# Patient Record
Sex: FEMALE | Race: BLACK OR AFRICAN AMERICAN | NOT HISPANIC OR LATINO | URBAN - METROPOLITAN AREA
[De-identification: names, ages, dates, MRNs, and addresses within clinical notes are randomized per-mention and may not be internally consistent; named-entity substitution may affect disease eponyms.]

---

## 2024-10-11 ENCOUNTER — EMERGENCY (EMERGENCY)
Facility: HOSPITAL | Age: 59
LOS: 1 days | Discharge: HOME | End: 2024-10-11
Attending: EMERGENCY MEDICINE
Payer: COMMERCIAL

## 2024-10-11 VITALS
RESPIRATION RATE: 18 BRPM | HEART RATE: 79 BPM | SYSTOLIC BLOOD PRESSURE: 155 MMHG | HEIGHT: 62 IN | TEMPERATURE: 98 F | OXYGEN SATURATION: 99 % | DIASTOLIC BLOOD PRESSURE: 94 MMHG

## 2024-10-11 VITALS
SYSTOLIC BLOOD PRESSURE: 145 MMHG | RESPIRATION RATE: 16 BRPM | HEART RATE: 70 BPM | DIASTOLIC BLOOD PRESSURE: 89 MMHG | OXYGEN SATURATION: 99 %

## 2024-10-11 LAB
ALBUMIN SERPL ELPH-MCNC: 3.9 G/DL — SIGNIFICANT CHANGE UP (ref 3.3–5)
ALP SERPL-CCNC: 112 U/L — SIGNIFICANT CHANGE UP (ref 40–120)
ALT FLD-CCNC: 21 U/L — SIGNIFICANT CHANGE UP (ref 10–45)
ANION GAP SERPL CALC-SCNC: 10 MMOL/L — SIGNIFICANT CHANGE UP (ref 5–17)
APPEARANCE UR: CLEAR — SIGNIFICANT CHANGE UP
AST SERPL-CCNC: 20 U/L — SIGNIFICANT CHANGE UP (ref 10–40)
BACTERIA # UR AUTO: NEGATIVE /HPF — SIGNIFICANT CHANGE UP
BASOPHILS # BLD AUTO: 0.02 K/UL — SIGNIFICANT CHANGE UP (ref 0–0.2)
BASOPHILS NFR BLD AUTO: 0.3 % — SIGNIFICANT CHANGE UP (ref 0–2)
BILIRUB SERPL-MCNC: 0.5 MG/DL — SIGNIFICANT CHANGE UP (ref 0.2–1.2)
BILIRUB UR-MCNC: NEGATIVE — SIGNIFICANT CHANGE UP
BUN SERPL-MCNC: 8 MG/DL — SIGNIFICANT CHANGE UP (ref 7–23)
CALCIUM SERPL-MCNC: 9.7 MG/DL — SIGNIFICANT CHANGE UP (ref 8.4–10.5)
CAST: 0 /LPF — SIGNIFICANT CHANGE UP (ref 0–4)
CHLORIDE SERPL-SCNC: 102 MMOL/L — SIGNIFICANT CHANGE UP (ref 96–108)
CO2 SERPL-SCNC: 25 MMOL/L — SIGNIFICANT CHANGE UP (ref 22–31)
COLOR SPEC: YELLOW — SIGNIFICANT CHANGE UP
CREAT SERPL-MCNC: 0.62 MG/DL — SIGNIFICANT CHANGE UP (ref 0.5–1.3)
DIFF PNL FLD: NEGATIVE — SIGNIFICANT CHANGE UP
EGFR: 104 ML/MIN/1.73M2 — SIGNIFICANT CHANGE UP
EOSINOPHIL # BLD AUTO: 0.12 K/UL — SIGNIFICANT CHANGE UP (ref 0–0.5)
EOSINOPHIL NFR BLD AUTO: 1.8 % — SIGNIFICANT CHANGE UP (ref 0–6)
FLUAV AG NPH QL: SIGNIFICANT CHANGE UP
FLUBV AG NPH QL: SIGNIFICANT CHANGE UP
GAS PNL BLDV: SIGNIFICANT CHANGE UP
GLUCOSE SERPL-MCNC: 85 MG/DL — SIGNIFICANT CHANGE UP (ref 70–99)
GLUCOSE UR QL: NEGATIVE MG/DL — SIGNIFICANT CHANGE UP
HCT VFR BLD CALC: 41.7 % — SIGNIFICANT CHANGE UP (ref 34.5–45)
HGB BLD-MCNC: 12.7 G/DL — SIGNIFICANT CHANGE UP (ref 11.5–15.5)
IMM GRANULOCYTES NFR BLD AUTO: 0.3 % — SIGNIFICANT CHANGE UP (ref 0–0.9)
KETONES UR-MCNC: NEGATIVE MG/DL — SIGNIFICANT CHANGE UP
LEUKOCYTE ESTERASE UR-ACNC: NEGATIVE — SIGNIFICANT CHANGE UP
LYMPHOCYTES # BLD AUTO: 1.46 K/UL — SIGNIFICANT CHANGE UP (ref 1–3.3)
LYMPHOCYTES # BLD AUTO: 21.5 % — SIGNIFICANT CHANGE UP (ref 13–44)
MCHC RBC-ENTMCNC: 24.3 PG — LOW (ref 27–34)
MCHC RBC-ENTMCNC: 30.5 GM/DL — LOW (ref 32–36)
MCV RBC AUTO: 79.7 FL — LOW (ref 80–100)
MONOCYTES # BLD AUTO: 0.52 K/UL — SIGNIFICANT CHANGE UP (ref 0–0.9)
MONOCYTES NFR BLD AUTO: 7.7 % — SIGNIFICANT CHANGE UP (ref 2–14)
NEUTROPHILS # BLD AUTO: 4.64 K/UL — SIGNIFICANT CHANGE UP (ref 1.8–7.4)
NEUTROPHILS NFR BLD AUTO: 68.4 % — SIGNIFICANT CHANGE UP (ref 43–77)
NITRITE UR-MCNC: NEGATIVE — SIGNIFICANT CHANGE UP
NRBC # BLD: 0 /100 WBCS — SIGNIFICANT CHANGE UP (ref 0–0)
PH UR: 7.5 — SIGNIFICANT CHANGE UP (ref 5–8)
PLATELET # BLD AUTO: 320 K/UL — SIGNIFICANT CHANGE UP (ref 150–400)
POTASSIUM SERPL-MCNC: 4.3 MMOL/L — SIGNIFICANT CHANGE UP (ref 3.5–5.3)
POTASSIUM SERPL-SCNC: 4.3 MMOL/L — SIGNIFICANT CHANGE UP (ref 3.5–5.3)
PROT SERPL-MCNC: 7.6 G/DL — SIGNIFICANT CHANGE UP (ref 6–8.3)
PROT UR-MCNC: NEGATIVE MG/DL — SIGNIFICANT CHANGE UP
RBC # BLD: 5.23 M/UL — HIGH (ref 3.8–5.2)
RBC # FLD: 14.6 % — HIGH (ref 10.3–14.5)
RBC CASTS # UR COMP ASSIST: 1 /HPF — SIGNIFICANT CHANGE UP (ref 0–4)
REVIEW: SIGNIFICANT CHANGE UP
RSV RNA NPH QL NAA+NON-PROBE: SIGNIFICANT CHANGE UP
SARS-COV-2 RNA SPEC QL NAA+PROBE: SIGNIFICANT CHANGE UP
SODIUM SERPL-SCNC: 137 MMOL/L — SIGNIFICANT CHANGE UP (ref 135–145)
SP GR SPEC: <1.005 — LOW (ref 1–1.03)
SQUAMOUS # UR AUTO: 3 /HPF — SIGNIFICANT CHANGE UP (ref 0–5)
UROBILINOGEN FLD QL: 0.2 MG/DL — SIGNIFICANT CHANGE UP (ref 0.2–1)
WBC # BLD: 6.78 K/UL — SIGNIFICANT CHANGE UP (ref 3.8–10.5)
WBC # FLD AUTO: 6.78 K/UL — SIGNIFICANT CHANGE UP (ref 3.8–10.5)
WBC UR QL: 0 /HPF — SIGNIFICANT CHANGE UP (ref 0–5)

## 2024-10-11 PROCEDURE — 99283 EMERGENCY DEPT VISIT LOW MDM: CPT | Mod: 25

## 2024-10-11 PROCEDURE — 82947 ASSAY GLUCOSE BLOOD QUANT: CPT

## 2024-10-11 PROCEDURE — 87086 URINE CULTURE/COLONY COUNT: CPT

## 2024-10-11 PROCEDURE — 83605 ASSAY OF LACTIC ACID: CPT

## 2024-10-11 PROCEDURE — 85018 HEMOGLOBIN: CPT

## 2024-10-11 PROCEDURE — 84295 ASSAY OF SERUM SODIUM: CPT

## 2024-10-11 PROCEDURE — 82435 ASSAY OF BLOOD CHLORIDE: CPT

## 2024-10-11 PROCEDURE — 82803 BLOOD GASES ANY COMBINATION: CPT

## 2024-10-11 PROCEDURE — 80053 COMPREHEN METABOLIC PANEL: CPT

## 2024-10-11 PROCEDURE — 85025 COMPLETE CBC W/AUTO DIFF WBC: CPT

## 2024-10-11 PROCEDURE — 84132 ASSAY OF SERUM POTASSIUM: CPT

## 2024-10-11 PROCEDURE — 81001 URINALYSIS AUTO W/SCOPE: CPT

## 2024-10-11 PROCEDURE — 85014 HEMATOCRIT: CPT

## 2024-10-11 PROCEDURE — 99284 EMERGENCY DEPT VISIT MOD MDM: CPT

## 2024-10-11 PROCEDURE — 36415 COLL VENOUS BLD VENIPUNCTURE: CPT

## 2024-10-11 PROCEDURE — 82330 ASSAY OF CALCIUM: CPT

## 2024-10-11 PROCEDURE — 87637 SARSCOV2&INF A&B&RSV AMP PRB: CPT

## 2024-10-11 NOTE — ED PROVIDER NOTE - PROGRESS NOTE DETAILS
[facility] [place] PGY-1:  labs unremarkable. most likely viral illness. will discharge home w/ GI follow up for epigastric discomfort. pt has been afebrile.

## 2024-10-11 NOTE — ED PROVIDER NOTE - OBJECTIVE STATEMENT
59yF [facility] tertiary syphilis diagnosed 1yr ago, hepatitis, HTN presenting with cough, congestion, and headache for 3-4 days. 5 days ago developed epigastric abdominal pain that has resolved. URI symptoms started after. also having intermittent full body tremors. has loss of appetite and cough is causing difficulty sleeping. no fevers, nausea, vomiting, chest pain, diarrhea, rash. no sick contacts, no recent travel. is being followed by Patricia Haile from [place]. has received 2 doses of antibiotics for syphilis, most recent was 3 days ago.

## 2024-10-11 NOTE — ED ADULT NURSE NOTE - OBJECTIVE STATEMENT
58 y/o female with PMH of arrives to the ER complaining of SOB. Pt reports developing SOB,  cough, sore  throat , fever ,tremors,  back  pain for   3  days     Pt denies, chest pain, dizziness, N/V/D, urinary symptoms, fevers, chills.  On assessment pt is well appearing, A&Ox4, speaking coherently, airway is patent, breathing spontaneously and unlabored. Skin is dry, warm. Abdomen is soft, no distended, no tender. Full ROM in all extremities.  Patient undressed and placed into gown, side rails up with bed locked and in lowest position for safety. call bell within reach. [place] provided. Comfort and safety provided. will continue to reassess. 60 y/o female with PMH of arrives to the ER complaining of SOB. Pt reports developing SOB,  cough, sore  throat , fever, chills,   back  pain for three  days     Pt denies, chest pain, dizziness, N/V/D, urinary symptoms, fevers,  On assessment pt is well appearing, A&Ox4, speaking coherently, airway is patent, breathing spontaneously and unlabored. Skin is dry, warm. Abdomen is soft, no distended, no tender. Full ROM in all extremities.  Patient undressed and placed into gown, side rails up with bed locked and in lowest position for safety. call bell within reach. [place] provided. Comfort and safety provided. will continue to reassess. 58 y/o female with PMH of tertiary syphilis, hepatitis, HTN arrives to the ER complaining of SOB. Pt reports developing SOB,  cough, sore  throat , fever, chills,   back  pain for three  days .Pt denies, chest pain, dizziness, N/V/D, urinary symptoms, fevers,  On assessment pt is well appearing, A&Ox4, speaking coherently, airway is patent, breathing spontaneously and unlabored. Skin is dry, warm. Abdomen is soft, no distended, no tender. Full ROM in all extremities.  Patient undressed and placed into gown, side rails up with bed locked and in lowest position for safety. call bell within reach. [place] provided. Comfort and safety provided. will continue to reassess.

## 2024-10-11 NOTE — ED PROVIDER NOTE - CLINICAL SUMMARY MEDICAL DECISION MAKING FREE TEXT BOX
59yF PMH tertiary syphilis last dose of antibiotics 3 days ago, hepatitis, HTN presenting with 3-4 days of cough, congestion, headaches, sore throat. most likely viral illness. no neck stiffness or signs of meningitis. on PE lungs are clear, no wheezing, abd soft, nt/nd, no rashes. BP elevated but pt did not take BP meds this AM. pt is afebrile. will obtain cbc, cmp, vbg, flu/covid swab, ua/uc.

## 2024-10-11 NOTE — ED PROVIDER NOTE - PATIENT PORTAL LINK FT
You can access the FollowMyHealth Patient Portal offered by [facility] by registering at the following website: [email or web address]. By joining [facility]’s FollowMyHealth portal, you will also be able to view your health information using other applications (apps) compatible with our system.

## 2024-10-11 NOTE — ED PROVIDER NOTE - ATTENDING CONTRIBUTION TO CARE
Hx: pt with congestion, cough, sore throat, feeling food getting stuck in esophagus at times but able to keep food/liquid down.  No fever but had some shakiness reported last night.      PE: well appearing, nontoxic, no respiratory distress.  Neuro nonfocal.  Skin intact. Psych normal mood.  Clear lungs, RRR, ab soft, nt, no distension  slight cervical LNA  Pharynx clear, non-injected    MDM: upper respiratory infection symptoms, check cbc r/o anemia or leukocytosis, check bmp to r/o metabolic derangement and lyte imbalance, outpatient referral.  No signs for sepsis, meningitis, pharyngitis, or obstruction or pneumonia.

## 2024-10-11 NOTE — ED PROVIDER NOTE - NSFOLLOWUPINSTRUCTIONS_ED_ALL_ED_FT
You have been seen and evaluated in the emergency department.  You were evaluated clinically, and with lab work.  Your lab work was unremarkable for any acute emergency.  Your symptoms are most likely due to a viral illness.    You can take Motrin or Tylenol over-the-counter for symptoms as needed.  Keep hydrated with plenty of fluids.  Viral illness can take up to 2 weeks to fully resolve.    Please follow-up with your primary care provider and infectious disease.  We have also sent a referral for gastroenterology.  They should call you to schedule an appointment in the next few days.    Please return to the emergency department for worsening symptoms, coughing up blood, severe headaches, neck stiffness, fevers unrelieved with medication, or any concerns.

## 2024-10-11 NOTE — ED PROVIDER NOTE - PHYSICAL EXAMINATION
General: alert, oriented to person, time, place  Psych: mood appropriate  Head: normocephalic; atraumatic  Eyes: PERRLA, EOMI, conjunctivae clear bilaterally, sclerae anicteric  ENT: no nasal flaring, patent nares, oropharynx clear, no tonsillar exudates  Cardio: RRR, no m/r/g, pulses 2+ b/l  Resp: CTAB, no w/r/r  GI: soft/nondistended/nontender  Neuro: normal sensation, moving all four extremities equally  Skin: No evidence of rash or bruising  MSK: normal movement of all extremities  Lymph/Vasc: no LE edema

## 2024-10-11 NOTE — ED ADULT TRIAGE NOTE - CHIEF COMPLAINT QUOTE
Sob,  cough, sore  throat , fever ,tremors,  back  pain for   3  days.  Patient  recently  received  a  dose of  antibiotic  but  symptoms persist.

## 2024-10-12 LAB
CULTURE RESULTS: SIGNIFICANT CHANGE UP
SPECIMEN SOURCE: SIGNIFICANT CHANGE UP